# Patient Record
Sex: MALE | Race: WHITE
[De-identification: names, ages, dates, MRNs, and addresses within clinical notes are randomized per-mention and may not be internally consistent; named-entity substitution may affect disease eponyms.]

---

## 2019-06-28 ENCOUNTER — HOSPITAL ENCOUNTER (EMERGENCY)
Dept: HOSPITAL 25 - UCCORT | Age: 74
Discharge: HOME HEALTH | End: 2019-06-28
Payer: MEDICARE

## 2019-06-28 VITALS — SYSTOLIC BLOOD PRESSURE: 159 MMHG | DIASTOLIC BLOOD PRESSURE: 86 MMHG

## 2019-06-28 DIAGNOSIS — R07.9: Primary | ICD-10-CM

## 2019-06-28 DIAGNOSIS — I10: ICD-10-CM

## 2019-06-28 PROCEDURE — 93005 ELECTROCARDIOGRAM TRACING: CPT

## 2019-06-28 PROCEDURE — G0463 HOSPITAL OUTPT CLINIC VISIT: HCPCS

## 2019-06-28 PROCEDURE — 99212 OFFICE O/P EST SF 10 MIN: CPT

## 2019-06-28 NOTE — UC
Cardiac HPI





- HPI Summary


HPI Summary: 





72 yo male had 15-20 minutes epigastric pain associated with diaphoresis 3 days 

ago





Last pm woke up with epigastric pain that lasted about one hour





no sob


no n/v





no recent URI symptoms





was told by PMD to go to ER











- History of Current Complaint


Chief Complaint: UCChestPain


Stated Complaint: CHEST PAIN,HEADACHE,CHILLS,ABD PAIN


Time Seen by Provider: 06/28/19 16:03


Hx Obtained From: Patient


Onset/Duration: Sudden Onset, Lasting Hours - 1


Initial Severity: Mild


Current Severity: None


Pain Intensity: 0


Chest Pain Location: Lower Sternal


Character: Pounding, Burning


Aggravating Factor(s): Nothing


Alleviating Factor(s): Spontaneous Resolution


Associated Signs & Symptoms: Positive: Chest Pain, Diaphoresis - first episo





- Allergy/Home Medications


Allergies/Adverse Reactions: 


 Allergies











Allergy/AdvReac Type Severity Reaction Status Date / Time


 


Penicillins Allergy Intermediate Rash Verified 06/28/19 16:11


 


leg pain after taking Statin AdvReac  Leg Cramps Uncoded 06/28/19 16:11





med     











Home Medications: 


 Home Medications





Aspirin EC TAB* [Ecotrin EC Low Dose 81 MG*] 81 mg PO EVERY OTHER DAY 06/28/19 [

History Confirmed 06/28/19]











PMH/Surg Hx/FS Hx/Imm Hx


Previously Healthy: Yes


Cardiovascular History: Hypertension





- Surgical History


Surgical History: Yes


Surgery Procedure, Year, and Place: appy 1959; T/A





- Family History


Known Family History: Positive: Hypertension





- Social History


Alcohol Use: Weekly


Alcohol Amount: "couple times a week"


Substance Use Type: None


Smoking Status (MU): Never Smoked Tobacco





Review of Systems


All Other Systems Reviewed And Are Negative: Yes


Constitutional: Positive: Negative


Skin: Positive: Negative


Eyes: Positive: Negative


ENT: Positive: Negative


Respiratory: Positive: Negative


Cardiovascular: Positive: Chest Pain


Gastrointestinal: Positive: Negative


Genitourinary: Positive: Negative


Motor: Positive: Negative


Neurovascular: Positive: Negative


Musculoskeletal: Positive: Negative


Neurological: Positive: Negative


Psychological: Positive: Negative





Physical Exam


Triage Information Reviewed: Yes


Appearance: Well-Appearing, No Pain Distress, Well-Nourished


Vital Signs: 


 Initial Vital Signs











Temp  99.1 F   06/28/19 16:03


 


Pulse  93   06/28/19 16:03


 


Resp  18   06/28/19 16:03


 


BP  159/86   06/28/19 16:03


 


Pulse Ox  97   06/28/19 16:03











Vital Signs Reviewed: Yes


Eyes: Positive: Conjunctiva Clear


ENT: Positive: Hearing grossly normal.  Negative: Nasal congestion, Nasal 

drainage, Trismus, Muffled voice, Dental tenderness


Neck: Positive: Supple, Nontender


Cardiovascular: Positive: RRR, No Murmur


Abdomen Description: Positive: Nontender, No Organomegaly, Soft.  Negative: 

Bruit, CVA Tenderness (R), CVA Tenderness (L), Distended


Musculoskeletal: Positive: ROM Intact, No Edema


Neurological: Positive: Alert


Psychological Exam: Normal


Skin Exam: Normal





Diagnostics





- EKG


Cardiac Rate: NL


Cardiac Rhythm: Sinus: Normal


Ectopy: None


EKG Comparison: Other - ? old [facility]





- Assessment/Plan


Course Of Treatment: 





discused with Malaika LOVE NP





to ER via POV





- Clinical Impression


Provider Diagnosis: 


 Chest pain of uncertain etiology








Discharge





- Sign-Out/Discharge


Documenting (check all that apply): Patient Departure


All imaging exams completed and their final reports reviewed: No Studies





- Discharge Plan


Condition: Stable


Disposition: HOME-RECOMMEND TO ED


Patient Education Materials:  Chest Pain (ED)


Additional Instructions: 


I suggest you go to the ER 





They are expecting you











- Billing Disposition and Condition


Condition: STABLE


Disposition: Home-Recommend to ED